# Patient Record
(demographics unavailable — no encounter records)

---

## 2025-06-25 NOTE — HISTORY OF PRESENT ILLNESS
[de-identified] : This 79-year-old male is here for evaluation of a long history of low back pain which has recently become more problematic with regards to the his ability to bend and to ambulate.  He does not complain of radicular symptomatology.  There has been no history of trauma.

## 2025-06-25 NOTE — PHYSICAL EXAM
[de-identified] : On physical examination the patient has significant difficulty with range of motion of the lumbar spine.  He can forward flex 30 degrees and extend 15 degrees.  Right and left lateral bending increases his pain and he can bend to 20 degrees bilaterally.  He does have tenderness and muscle spasm especially in the left side of the lumbar spine.  [de-identified] : X-ray evaluation of the lumbosacral spine on 6/25/2025 (AP and lateral views) reveals a moderate degree of diffuse degenerative arthritis.

## 2025-06-25 NOTE — ASSESSMENT
[FreeTextEntry1] : LIZZIE LS spine  I advised the patient that if he is not improved after the trigger point injection he should return for reinjection.

## 2025-06-25 NOTE — PROCEDURE
[FreeTextEntry1] :  (trigger point injection)The left lumbar musculature was prepped with Betadine and at this time 1 mL of 40 mg of Depo-Medrol and 2 mL of 1% plain lidocaine was injected into the left lumbar musculature without adverse reaction

## 2025-07-19 NOTE — HISTORY OF PRESENT ILLNESS
[de-identified] : This 79-year-old male returns with continued left sided lumbar spine pain.  The patient did have a trigger point injection on 6/25/2025 but he was doing heavy work after that and he has had recurrence of pain in the left lumbar musculature.

## 2025-07-19 NOTE — PROCEDURE
[FreeTextEntry1] : The left lumbar musculature was prepped with Betadine.  At this time 1 mL of 40 mg of Depo-Medrol and 2 mL of 1% plain lidocaine have been injected into the left lumbar musculature without adverse reaction (trigger point injection)

## 2025-07-19 NOTE — PHYSICAL EXAM
[de-identified] : On physical examination there is left lumbar muscle spasm and tenderness.  Left lateral bending and rotation increases his back pain.  The patient has significant difficulty with attempts at forward flexion of the lumbar spine.  Straight leg raising test is negative bilaterally.  Motor or sensory and deep tendon reflex examination of both lower extremities is within normal limits.